# Patient Record
Sex: MALE
[De-identification: names, ages, dates, MRNs, and addresses within clinical notes are randomized per-mention and may not be internally consistent; named-entity substitution may affect disease eponyms.]

---

## 2021-12-04 ENCOUNTER — NURSE TRIAGE (OUTPATIENT)
Dept: OTHER | Facility: CLINIC | Age: 36
End: 2021-12-04

## 2021-12-04 NOTE — TELEPHONE ENCOUNTER
Reason for Disposition   [1] Over 3 days (72 hours) since shot AND [2] redness, swelling or pain getting worse    Answer Assessment - Initial Assessment Questions  1. SYMPTOMS: \"What is the main symptom? \" (e.g., redness, swelling, pain)       Swelling in the right arm - no swelling at the injection side - injection site is sore, swelling is beneath the arm pit and in the neck collar bone area    2. ONSET: \"When was the vaccine (shot) given? \" \"How much later did the symptoms begin? \" (e.g., hours, days ago)       Injection was given on Wednesday    3. SEVERITY: \"How bad is it? \"       It is swelling - neck swelling is obvious - no knots in the arm pit - just large soft swelling    4. FEVER: \"Is there a fever? \" If so, ask: \"What is it, how was it measured, and when did it start? \"       No fever    5. IMMUNIZATIONS GIVEN: \"What shots have you recently received? \"      Booster for Covid 19 - Pfizer    6. PAST REACTIONS: \"Have you reacted to immunizations before? \" If so, ask: \"What happened? \"      No past reactions    7. OTHER SYMPTOMS: \"Do you have any other symptoms? \"      No other symptoms    Protocols used: IMMUNIZATION REACTIONS-ADULT-AH    Brief description of triage: Patient had his [employer] booster on Wednesday. He has noticeable swelling in his right collar bone, neck and armpit. Triage indicates for patient to be seen in the next 24 hours in an Urgent Care. Care advice provided, patient verbalizes understanding; denies any other questions or concerns; instructed to call back for any new or worsening symptoms. This triage is a result of a call to [address] [place] [address]. Please do not respond to the triage nurse through this encounter. Any subsequent communication should be directly with the patient.